# Patient Record
Sex: FEMALE | Race: WHITE | NOT HISPANIC OR LATINO | Employment: OTHER | URBAN - METROPOLITAN AREA
[De-identification: names, ages, dates, MRNs, and addresses within clinical notes are randomized per-mention and may not be internally consistent; named-entity substitution may affect disease eponyms.]

---

## 2017-05-01 LAB — INR PPP: 2.2 (ref 2–3.5)

## 2017-06-05 ENCOUNTER — TELEPHONE (OUTPATIENT)
Dept: VASCULAR LAB | Facility: MEDICAL CENTER | Age: 72
End: 2017-06-05

## 2017-06-05 ENCOUNTER — ANTICOAGULATION VISIT (OUTPATIENT)
Dept: MEDICAL GROUP | Facility: MEDICAL CENTER | Age: 72
End: 2017-06-05
Payer: COMMERCIAL

## 2017-06-05 VITALS — HEART RATE: 98 BPM | SYSTOLIC BLOOD PRESSURE: 118 MMHG | DIASTOLIC BLOOD PRESSURE: 67 MMHG

## 2017-06-05 DIAGNOSIS — Z79.01 LONG TERM (CURRENT) USE OF ANTICOAGULANTS: Chronic | ICD-10-CM

## 2017-06-05 DIAGNOSIS — I48.91 ATRIAL FIBRILLATION, UNSPECIFIED TYPE (HCC): ICD-10-CM

## 2017-06-05 DIAGNOSIS — Z79.01 LONG TERM CURRENT USE OF ANTICOAGULANT THERAPY: ICD-10-CM

## 2017-06-05 LAB — INR PPP: 1.9 (ref 2–3.5)

## 2017-06-05 PROCEDURE — 85610 PROTHROMBIN TIME: CPT | Performed by: PHYSICIAN ASSISTANT

## 2017-06-05 RX ORDER — WARFARIN SODIUM 5 MG/1
5 TABLET ORAL DAILY
Qty: 30 TAB | Refills: 3 | Status: SHIPPED | OUTPATIENT
Start: 2017-06-05

## 2017-06-05 NOTE — PROGRESS NOTES
Anticoagulation Summary as of 6/5/2017     INR goal 2.0-3.0   Selected INR 2.2 (5/1/2017)   Maintenance plan 5 mg (5 mg x 1) on Mon, Wed, Fri; 2.5 mg (5 mg x 0.5) all other days   Weekly total 25 mg   Plan last modified Zachary Ansari, PHARMD (6/5/2017)   Next INR check 7/3/2017   Target end date     Indications   Atrial fibrillation (CMS-HCC) [I48.91]  Long term current use of anticoagulant therapy [Z79.01]         Anticoagulation Episode Summary     [facility]    Preferred lab     Send INR reminders to     Comments       Anticoagulation Care Providers     Provider Role Specialty Phone number    [facility] Responsible  [phone]        Anticoagulation Patient Findings      Past Medical History   Diagnosis Date   • Atrial fibrillation (CMS-HCC) 10/26/2011   • Long term (current) use of anticoagulants 10/26/2011   • Right bundle branch block 10/26/2011   • DM (diabetes mellitus) (CMS-HCC) 10/26/2011   • Esophageal reflux 2/18/2011   • GOUT 10/26/2011   • HTN (hypertension) 10/26/2011   • Paroxysmal atrial fibrillation (CMS-HCC) 7/14/2009       Current Outpatient Prescriptions on File Prior to Visit   Medication Sig Dispense Refill   • potassium chloride SA (K-DUR) 20 MEQ Tab CR 1 tab daily 30 Tab 0   • furosemide (LASIX) 20 MG Tab Take 1 Tab by mouth See Admin Instructions. Take 20mg every other day. 90 Tab 2   • enalapril (VASOTEC) 10 MG Tab Take 1 Tab by mouth every day. 90 Tab 3   • metoprolol (LOPRESSOR) 100 MG Tab Take 1 Tab by mouth 2 times a day. 180 Tab 3   • TURMERIC CURCUMIN PO Take  by mouth.     • Fexofenadine HCl (ALLEGRA PO) Take  by mouth.     • Cholecalciferol (VITAMIN D3) 5000 UNIT TABS Take 5,000 Units by mouth every day.       • warfarin (COUMADIN) 2.5 MG TABS Take 1 Tab by mouth every day at 6 PM. TAKE AS DIRECTED. 30 Tab 3   • warfarin (COUMADIN) 5 MG TABS Take 1 Tab by mouth every day at 6 PM. TAKE AS DIRECTED. 30 Tab 3   • colchicine (COLCRYS)  0.6 MG TABS Take 0.6 mg by mouth every day.         No current facility-administered medications on file prior to visit.       Lab Results   Component Value Date/Time    SODIUM 141 01/25/2017 11:39 AM    POTASSIUM 4.6 01/25/2017 11:39 AM    CHLORIDE 103 01/25/2017 11:39 AM    CO2 22 01/25/2017 11:39 AM    GLUCOSE 97 01/25/2017 11:39 AM    BUN 32* 01/25/2017 11:39 AM    CREATININE 1.17* 01/25/2017 11:39 AM    BUN-CREATININE RATIO 27* 01/25/2017 11:39 AM        Shannon Mahajan referred to the [facility] for  A.fib, she has been on Warfarin for a few years,managed by PCP. Her last INR 4 weeks ago was therapeutic. She gets her INRs each month. She missed a dose this last week because of moving back to [place]. Appears to have a good understanding of warfarin, diet and bleed/stroke risks with this drug.     CHADSVAS=3, DM on problem list, but looks like it is a error per pt and labs.     INR  sub-therapeutic.      Pt gave verbal consent  to leave a VM with detailed medical information regarding INR and dose of warfarin.    Pt tolerating medication well, no s/s of bleeding.     Med rec done with pt (see above)    Pt education done (s/s of bleeding, when to f/u with clinic vs ER, foods with vitamin K, etc)    Follow up appointment in 4 week(s), I would like to see her sooner but she declined as she reports she is stable most of the time, she is aware of the risk of warfarin and that it requires frequent monitoring.     1.5 tabs today then continue weekly warfarin dose as noted      Zachary Ansari, PHARMD

## 2017-06-05 NOTE — MR AVS SNAPSHOT
Shannon WADDELL Keyshawn   2017 9:15 AM   Anticoagulation Visit   MRN: [id number]    [facility]   Dept Phone:  [phone]    Description:  Female : 1945   Provider:  [facility]           Allergies as of 2017     No Known Allergies      You were diagnosed with     Atrial fibrillation, unspecified type (CMS-HCC)   [0628090]       Long term current use of anticoagulant therapy   [5660324]         Vital Signs     Blood Pressure Pulse Smoking Status             118/67 mmHg 98 Former Smoker         Basic Information     Date Of Birth Sex Race Ethnicity Preferred Language    1945 Female White Non- English      Your appointments     2017  9:15 AM   Anti-Coag New Patient with [facility]   [facility] ([place]    [address]  [address]  [place] [address] [phone]            2017  9:00 AM   Anti-Coag Routine with [facility]   [facility] ([place]    [address]  [address]  [place] [address] [phone]              Problem List              ICD-10-CM Priority Class Noted - Resolved    Atrial fibrillation (CMS-HCC) (Chronic) I48.91   10/26/2011 - Present    Long term current use of anticoagulant therapy (Chronic) Z79.01   10/26/2011 - Present    Right bundle branch block (Chronic) I45.10   10/26/2011 - Present    DM (diabetes mellitus) (CMS-HCC) (Chronic) E11.9   10/26/2011 - Present    Esophageal reflux (Chronic) K21.9   2011 - Present    Gout (Chronic) M10.9   10/26/2011 - Present    HTN (hypertension) (Chronic) I10   10/26/2011 - Present    Edema R60.9   10/9/2013 - Present      Health Maintenance        Date Due Completion Dates    A1C SCREENING 3/6/1946 ---    DIABETES MONOFILAMENT / LE EXAM 3/6/1946 ---    RETINAL SCREENING 1961 ---    URINE ACR / MICROALBUMIN 1961 ---    IMM DTaP/Tdap/Td Vaccine (1 - Tdap)  9/6/1962 ---    PAP SMEAR 9/6/1964 ---    COLONOSCOPY 9/6/1993 ---    IMM ZOSTER VACCINE 9/6/2003 ---    MAMMOGRAM 9/15/2005 9/15/2004    BONE DENSITY 9/6/2008 ---    IMM PNEUMOCOCCAL 65+ (ADULT) LOW/MEDIUM RISK SERIES (1 of 2 - PCV13) 9/6/2008 ---    FASTING LIPID PROFILE 10/19/2017 10/19/2016    SERUM CREATININE 1/25/2018 1/25/2017, 10/19/2016            Results     POCT Protime      Component    INR    1.9    Comment:     ic valid [id number] exp 03/2018                PROTHROMBIN TIME      Component    INR    2.2                        Current Immunizations     No immunizations on file.      Below and/or attached are the medications your provider expects you to take. Review all of your home medications and newly ordered medications with your provider and/or pharmacist. Follow medication instructions as directed by your provider and/or pharmacist. Please keep your medication list with you and share with your provider. Update the information when medications are discontinued, doses are changed, or new medications (including over-the-counter products) are added; and carry medication information at all times in the event of emergency situations     Allergies:  No Known Allergies          Medications  Valid as of: June 05, 2017 -  9:11 AM    Generic Name Brand Name Tablet Size Instructions for use    Cholecalciferol (Tab) Vitamin D3 5000 UNITS Take 5,000 Units by mouth every day.          Colchicine (Tab) COLCRYS 0.6 MG Take 0.6 mg by mouth every day.          Enalapril Maleate (Tab) VASOTEC 10 MG Take 1 Tab by mouth every day.        Fexofenadine HCl   Take  by mouth.        Furosemide (Tab) LASIX 20 MG Take 1 Tab by mouth See Admin Instructions. Take 20mg every other day.        Metoprolol Tartrate (Tab) LOPRESSOR 100 MG Take 1 Tab by mouth 2 times a day.        Potassium Chloride Sheila CR (Tab CR) Kdur 20 MEQ 1 tab daily        Turmeric   Take  by mouth.        Warfarin Sodium (Tab) COUMADIN 2.5 MG Take 1 Tab by  mouth every day at 6 PM. TAKE AS DIRECTED.        Warfarin Sodium (Tab) COUMADIN 5 MG Take 1 Tab by mouth every day at 6 PM. TAKE AS DIRECTED.        .                 Medicines prescribed today were sent to:     [id number] - [place], [place] - [address] AT [address]    [address] [facility] [address]    Phone: [phone] Fax: [phone]    Open 24 Hours?: No    [id number] - [place], [place] - [address]    [address] [place] [address]    Phone: [phone] Fax: [phone]    Open 24 Hours?: No      Medication refill instructions:       If your prescription bottle indicates you have medication refills left, it is not necessary to call your provider’s office. Please contact your pharmacy and they will refill your medication.    If your prescription bottle indicates you do not have any refills left, you may request refills at any time through one of the following ways: The online [employer]urant system (except Urgent Care), by calling your provider’s office, or by asking your pharmacy to contact your provider’s office with a refill request. Medication refills are processed only during regular business hours and may not be available until the next business day. Your provider may request additional information or to have a follow-up visit with you prior to refilling your medication.   *Please Note: Medication refills are assigned a new Rx number when refilled electronically. Your pharmacy may indicate that no refills were authorized even though a new prescription for the same medication is available at the pharmacy. Please request the medicine by name with the pharmacy before contacting your provider for a refill.        Warfarin Dosing Calendar   June 2017 Details    Sun Mon Tue Wed Thu Fri Sat         1               2               3                 4               5   1.9   7.5 mg   See details      6      2.5 mg         7      5 mg          8      2.5 mg         9      5 mg         10      2.5 mg           11      2.5 mg         12      5 mg         13      2.5 mg         14      5 mg         15      2.5 mg         16      5 mg         17      2.5 mg           18      2.5 mg         19      5 mg         20      2.5 mg         21      5 mg         22      2.5 mg         23      5 mg         24      2.5 mg           25      2.5 mg         26      5 mg         27      2.5 mg         28      5 mg         29      2.5 mg         30      5 mg           Date Details   06/05 This INR check   INR: 1.9    valid [id number] 160 exp 03/2018               How to take your warfarin dose     To take:  2.5 mg Take 0.5 of a 5 mg tablet.    To take:  5 mg Take 1 of the 5 mg tablets.    To take:  7.5 mg Take 1.5 of the 5 mg tablets.           Warfarin Dosing Calendar   July 2017 Details    Sun Mon Tue Wed Thu Fri Sat           1      2.5 mg           2      2.5 mg         3      5 mg         4               5               6               7               8                 9               10               11               12               13               14               15                 16               17               18               19               20               21               22                 23               24               25               26               27               28               29                 30               31                     Date Details   No additional details    Date of next INR:  7/3/2017         How to take your warfarin dose     To take:  2.5 mg Take 0.5 of a 5 mg tablet.    To take:  5 mg Take 1 of the 5 mg tablets.              m2M [employer]ies Access Code: [email or web address]  Expires: 7/5/2017  9:11 AM    m2M [employer]ies  A secure, online tool to manage your health information     BlueLithium® is a secure, online tool that connects you to your personalized health information from the privacy of your home -- day or night - making  it very easy for you to manage your healthcare. Once the activation process is completed, you can even access your medical information using the Starvine felicitas, which is available for free in the Apple Felicitas store or Google Play store.     Starvine provides the following levels of access (as shown below):   My Chart Features   [facility] Doctor [facility]  Specialists [facility]  Urgent  Care [facility]  Primary Care  Doctor   Email your healthcare team securely and privately 24/7 X X X    Manage appointments: schedule your next appointment; view details of past/upcoming appointments X      Request prescription refills. X      View recent personal medical records, including lab and immunizations X X X X   View health record, including health history, allergies, medications X X X X   Read reports about your outpatient visits, procedures, consult and ER notes X X X X   See your discharge summary, which is a recap of your hospital and/or ER visit that includes your diagnosis, lab results, and care plan. X X       How to register for Starvine:  1. Go to  [email or web address].  2. Click on the Sign Up Now box, which takes you to the New Member Sign Up page. You will need to provide the following information:  a. Enter your Starvine Access Code exactly as it appears at the top of this page. (You will not need to use this code after you’ve completed the sign-up process. If you do not sign up before the expiration date, you must request a new code.)   b. Enter your date of birth.   c. Enter your home email address.   d. Click Submit, and follow the next screen’s instructions.  3. Create a Starvine ID. This will be your Starvine login ID and cannot be changed, so think of one that is secure and easy to remember.  4. Create a Starvine password. You can change your password at any time.  5. Enter your Password Reset Question and Answer. This can be used at a later time if you forget your password.   6. Enter your e-mail address.  This allows you to receive e-mail notifications when new information is available in EdRover.  7. Click Sign Up. You can now view your health information.    For assistance activating your EdRover account, call [phone]

## 2017-06-06 NOTE — TELEPHONE ENCOUNTER
Initial anticoagulation clinic note and most recent cardiology note reviewed  Patient with atrial fibrillation andCHADS-VASC score = approximately 3    Will continue with indefinite anticoagulation with warfarin as recommended by cardiology.  Agree with plans for anticoagulation as outlined by anticoagulation clinical staff.  Will defer management of rhythm and rate control as well as all other cardiovascular issues to cardiology.  Will defer management of all other medical issues to cardiologist and PCP    Michael J Bloch, MD  Anticoagulation Clinic    cc:  Checo Parsons

## 2017-07-03 ENCOUNTER — ANTICOAGULATION VISIT (OUTPATIENT)
Dept: MEDICAL GROUP | Facility: MEDICAL CENTER | Age: 72
End: 2017-07-03
Payer: COMMERCIAL

## 2017-07-03 VITALS — SYSTOLIC BLOOD PRESSURE: 121 MMHG | HEART RATE: 91 BPM | DIASTOLIC BLOOD PRESSURE: 70 MMHG

## 2017-07-03 DIAGNOSIS — I48.91 ATRIAL FIBRILLATION, UNSPECIFIED TYPE (HCC): ICD-10-CM

## 2017-07-03 DIAGNOSIS — Z79.01 LONG TERM CURRENT USE OF ANTICOAGULANT THERAPY: ICD-10-CM

## 2017-07-03 LAB — INR PPP: 3.4 (ref 2–3.5)

## 2017-07-03 PROCEDURE — 85610 PROTHROMBIN TIME: CPT | Performed by: INTERNAL MEDICINE

## 2017-07-03 NOTE — PROGRESS NOTES
Anticoagulation Summary as of 7/3/2017     INR goal 2.0-3.0   Selected INR 3.4! (7/3/2017)   Maintenance plan 5 mg (5 mg x 1) on Mon, Wed, Fri; 2.5 mg (2.5 mg x 1) all other days   Weekly total 25 mg   Plan last modified Zachary Ansari, PHARMD (6/5/2017)   Next INR check 7/31/2017   Target end date     Indications   Atrial fibrillation (CMS-HCC) [I48.91]  Long term current use of anticoagulant therapy [Z79.01]         Anticoagulation Episode Summary     [facility]    Preferred lab     Send INR reminders to     Comments       Anticoagulation Care Providers     Provider Role Specialty Phone number    [facility] Responsible  [phone]        Anticoagulation Patient Findings      Current Outpatient Prescriptions on File Prior to Visit   Medication Sig Dispense Refill   • warfarin (COUMADIN) 5 MG Tab Take 1 Tab by mouth every day at 6 PM. TAKE AS DIRECTED. 30 Tab 3   • potassium chloride SA (K-DUR) 20 MEQ Tab CR 1 tab daily 30 Tab 0   • furosemide (LASIX) 20 MG Tab Take 1 Tab by mouth See Admin Instructions. Take 20mg every other day. 90 Tab 2   • enalapril (VASOTEC) 10 MG Tab Take 1 Tab by mouth every day. 90 Tab 3   • metoprolol (LOPRESSOR) 100 MG Tab Take 1 Tab by mouth 2 times a day. 180 Tab 3   • TURMERIC CURCUMIN PO Take  by mouth.     • Fexofenadine HCl (ALLEGRA PO) Take  by mouth.     • Cholecalciferol (VITAMIN D3) 5000 UNIT TABS Take 5,000 Units by mouth every day.       • warfarin (COUMADIN) 2.5 MG TABS Take 1 Tab by mouth every day at 6 PM. TAKE AS DIRECTED. 30 Tab 3   • colchicine (COLCRYS) 0.6 MG TABS Take 0.6 mg by mouth every day.         No current facility-administered medications on file prior to visit.       Lab Results   Component Value Date/Time    SODIUM 141 01/25/2017 11:39 AM    POTASSIUM 4.6 01/25/2017 11:39 AM    CHLORIDE 103 01/25/2017 11:39 AM    CO2 22 01/25/2017 11:39 AM    GLUCOSE 97 01/25/2017 11:39 AM    BUN 32* 01/25/2017 11:39 AM     CREATININE 1.17* 01/25/2017 11:39 AM    BUN-CREATININE RATIO 27* 01/25/2017 11:39 AM          Shannon Mahajan seen in clinic today  INR  supra-therapeutic.    Denies signs/symptoms of bleeding and/or thrombosis.    Denies changes to diet or medications.   Follow up appointment in 4 week(s).    Hold today then continue weekly warfarin dose as noted     Zachary Ansari, PHARMD

## 2017-07-03 NOTE — MR AVS SNAPSHOT
Shannon WADDELL Mahajan   7/3/2017 9:00 AM   Anticoagulation Visit   MRN: [id number]    Department:  [facility] [place] [facility]   Dept Phone:  [phone]    Description:  Female : 1945   Provider:  [facility]           Allergies as of 7/3/2017     No Known Allergies      You were diagnosed with     Atrial fibrillation, unspecified type (CMS-HCC)   [1385147]       Long term current use of anticoagulant therapy   [6694526]         Vital Signs     Blood Pressure Pulse Smoking Status             121/70 mmHg 91 Former Smoker         Basic Information     Date Of Birth Sex Race Ethnicity Preferred Language    1945 Female White Non- English      Your appointments     2017  9:15 AM   Anti-Coag Routine with [facility] PHARMACIST   [facility] [facility]    [address]  [address]  [place] [id number] [phone]              Problem List              ICD-10-CM Priority Class Noted - Resolved    Atrial fibrillation (CMS-HCC) (Chronic) I48.91   10/26/2011 - Present    Long term current use of anticoagulant therapy (Chronic) Z79.01   10/26/2011 - Present    Right bundle branch block (Chronic) I45.10   10/26/2011 - Present    DM (diabetes mellitus) (CMS-HCC) (Chronic) E11.9   10/26/2011 - Present    Esophageal reflux (Chronic) K21.9   2011 - Present    Gout (Chronic) M10.9   10/26/2011 - Present    HTN (hypertension) (Chronic) I10   10/26/2011 - Present    Edema R60.9   10/9/2013 - Present      Health Maintenance        Date Due Completion Dates    A1C SCREENING 3/6/1946 ---    DIABETES MONOFILAMENT / LE EXAM 3/6/1944 ---    RETINAL SCREENING 1961 ---    URINE ACR / MICROALBUMIN 1961 ---    IMM DTaP/Tdap/Td Vaccine (1 - Tdap) 1962 ---    PAP SMEAR 1964 ---    COLONOSCOPY 1993 ---    IMM ZOSTER VACCINE 2003 ---    MAMMOGRAM 9/15/2005 9/15/2004    BONE DENSITY 2008 ---    IMM PNEUMOCOCCAL 65+ (ADULT) LOW/MEDIUM RISK  SERIES (1 of 2 - PCV13) 9/6/2008 ---    IMM INFLUENZA (1) 9/1/2017 ---    FASTING LIPID PROFILE 10/19/2017 10/19/2016    SERUM CREATININE 1/25/2018 1/25/2017, 10/19/2016            Results     POCT Protime      Component    INR    3.4    Comment:     ic valid [id number] exp 03/2018                        Current Immunizations     No immunizations on file.      Below and/or attached are the medications your provider expects you to take. Review all of your home medications and newly ordered medications with your provider and/or pharmacist. Follow medication instructions as directed by your provider and/or pharmacist. Please keep your medication list with you and share with your provider. Update the information when medications are discontinued, doses are changed, or new medications (including over-the-counter products) are added; and carry medication information at all times in the event of emergency situations     Allergies:  No Known Allergies          Medications  Valid as of: July 03, 2017 -  9:10 AM    Generic Name Brand Name Tablet Size Instructions for use    Cholecalciferol (Tab) Vitamin D3 5000 UNITS Take 5,000 Units by mouth every day.          Colchicine (Tab) COLCRYS 0.6 MG Take 0.6 mg by mouth every day.          Enalapril Maleate (Tab) VASOTEC 10 MG Take 1 Tab by mouth every day.        Fexofenadine HCl   Take  by mouth.        Furosemide (Tab) LASIX 20 MG Take 1 Tab by mouth See Admin Instructions. Take 20mg every other day.        Metoprolol Tartrate (Tab) LOPRESSOR 100 MG Take 1 Tab by mouth 2 times a day.        Potassium Chloride Sheila CR (Tab CR) Kdur 20 MEQ 1 tab daily        Turmeric   Take  by mouth.        Warfarin Sodium (Tab) COUMADIN 2.5 MG Take 1 Tab by mouth every day at 6 PM. TAKE AS DIRECTED.        Warfarin Sodium (Tab) COUMADIN 5 MG Take 1 Tab by mouth every day at 6 PM. TAKE AS DIRECTED.        .                 Medicines prescribed today were sent to:     [facility]/PHARMACY #[id number] -  [place], [place] - [address] AT [facility]    [address] [facility]. [facility] [address]    Phone: [phone] Fax: [phone]    Open 24 Hours?: No    [id number] - [place], [place] - [address]    [address] [place] [address]    Phone: [phone] Fax: [phone]    Open 24 Hours?: No      Medication refill instructions:       If your prescription bottle indicates you have medication refills left, it is not necessary to call your provider’s office. Please contact your pharmacy and they will refill your medication.    If your prescription bottle indicates you do not have any refills left, you may request refills at any time through one of the following ways: The online [employer] system (except Urgent Care), by calling your provider’s office, or by asking your pharmacy to contact your provider’s office with a refill request. Medication refills are processed only during regular business hours and may not be available until the next business day. Your provider may request additional information or to have a follow-up visit with you prior to refilling your medication.   *Please Note: Medication refills are assigned a new Rx number when refilled electronically. Your pharmacy may indicate that no refills were authorized even though a new prescription for the same medication is available at the pharmacy. Please request the medicine by name with the pharmacy before contacting your provider for a refill.        Your To Do List     Standing Orders Interval Expires    PROTHROMBIN TIME  daily until 7/3/2018 7/3/2018      Warfarin Dosing Calendar   July 2017 Details    Sun Mon Tue Wed Thu Fri Sat           1                 2               3   3.4   Hold   See details      4      2.5 mg         5      5 mg         6      2.5 mg         7      5 mg         8      2.5 mg           9      2.5 mg         10      5 mg         11      2.5 mg         12      5 mg         13         2.5 mg         14      5 mg         15      2.5 mg           16      2.5 mg         17      5 mg         18      2.5 mg         19      5 mg         20      2.5 mg         21      5 mg         22      2.5 mg           23      2.5 mg         24      5 mg         25      2.5 mg         26      5 mg         27      2.5 mg         28      5 mg         29      2.5 mg           30      2.5 mg         31      5 mg               Date Details   07/03 This INR check   INR: 3.4    valid [id number] 160 exp 03/2018       Date of next INR:  7/31/2017         How to take your warfarin dose     To take:  2.5 mg Take 1 of the 2.5 mg tablets.    To take:  5 mg Take 1 of the 5 mg tablets.    Hold Do not take your warfarin dose. See the Details table to the right for additional instructions.                   GTxcel Access Code: [email or web address]  Expires: 7/5/2017  9:11 AM    GTxcel  A secure, online tool to manage your health information     [employer] GTxcel® is a secure, online tool that connects you to your personalized health information from the privacy of your home -- day or night - making it very easy for you to manage your healthcare. Once the activation process is completed, you can even access your medical information using the GTxcel felicitas, which is available for free in the Apple Felicitas store or Google Play store.     GTxcel provides the following levels of access (as shown below):   My Chart Features   [facility] Doctor [facility]  Urgent  Care Non-[facility]  Primary Care  Doctor   Email your healthcare team securely and privately 24/7 X X X    Manage appointments: schedule your next appointment; view details of past/upcoming appointments X      Request prescription refills. X      View recent personal medical records, including lab and immunizations X X X X   View health record, including health history, allergies, medications X X X X   Read reports about your outpatient visits,  procedures, consult and ER notes X X X X   See your discharge summary, which is a recap of your hospital and/or ER visit that includes your diagnosis, lab results, and care plan. X X       How to register for [facility]:  1. Go to  [email or web address].  2. Click on the Sign Up Now box, which takes you to the New Member Sign Up page. You will need to provide the following information:  a. Enter your [facility] Access Code exactly as it appears at the top of this page. (You will not need to use this code after you’ve completed the sign-up process. If you do not sign up before the expiration date, you must request a new code.)   b. Enter your date of birth.   c. Enter your home email address.   d. Click Submit, and follow the next screen’s instructions.  3. Create a BrandMe crowdmarketingt ID. This will be your BrandMe crowdmarketingt login ID and cannot be changed, so think of one that is secure and easy to remember.  4. Create a BrandMe crowdmarketingt password. You can change your password at any time.  5. Enter your Password Reset Question and Answer. This can be used at a later time if you forget your password.   6. Enter your e-mail address. This allows you to receive e-mail notifications when new information is available in [facility].  7. Click Sign Up. You can now view your health information.    For assistance activating your [facility] account, call [phone]

## 2017-07-27 ENCOUNTER — ANTICOAGULATION VISIT (OUTPATIENT)
Dept: MEDICAL GROUP | Facility: MEDICAL CENTER | Age: 72
End: 2017-07-27
Payer: COMMERCIAL

## 2017-07-27 DIAGNOSIS — Z79.01 LONG TERM CURRENT USE OF ANTICOAGULANT THERAPY: ICD-10-CM

## 2017-07-27 DIAGNOSIS — I48.91 ATRIAL FIBRILLATION, UNSPECIFIED TYPE (HCC): ICD-10-CM

## 2017-07-27 LAB — INR PPP: 2.8 (ref 2–3.5)

## 2017-07-27 PROCEDURE — 85610 PROTHROMBIN TIME: CPT | Performed by: PHYSICIAN ASSISTANT

## 2017-07-27 NOTE — PROGRESS NOTES
OP Anticoagulation Service Note    Date: 7/27/2017    Anticoagulation Summary as of 7/27/2017     INR goal 2.0-3.0   Selected INR 2.8 (7/27/2017)   Maintenance plan 5 mg (5 mg x 1) on Mon, Wed, Fri; 2.5 mg (2.5 mg x 1) all other days   Weekly total 25 mg   Plan last modified LISETTE HarrisD (6/5/2017)   Next INR check 8/24/2017   Target end date     Indications   Atrial fibrillation (CMS-HCC) [I48.91]  Long term current use of anticoagulant therapy [Z79.01]         Anticoagulation Episode Summary     INR check location [facility]    Preferred lab     Send INR reminders to     Comments       Anticoagulation Care Providers     Provider Role Specialty Phone number    [facility] Responsible  [phone]        Anticoagulation Patient Findings      Plan:  Patient is therapeutic today. Confirmed dosing regimen. Patient denies any changes in medications or diet. Patient denies any signs or symptoms of bleeding or clotting. Instructed patient to call clinic if any unusual bleeding or bruising occurs. Will continue dosing as outlined. Will follow-up with patient in 4 week(s). She will be gone in [place]. She was given a standing order and she will check in 4 weeks.       Bere Mills, Pharm D

## 2017-07-27 NOTE — MR AVS SNAPSHOT
Shannon WADDELL Keyshawn   2017 2:45 PM   Anticoagulation Visit   MRN: [id number]    Department:  [facility]   Dept Phone:  [phone]    Description:  Female : 1945   Provider:  [facility]           Allergies as of 2017     No Known Allergies      You were diagnosed with     Atrial fibrillation, unspecified type (CMS-[facility])   [4985915]       Long term current use of anticoagulant therapy   [9632365]         Vital Signs     Smoking Status                   Former Smoker           Basic Information     Date Of Birth Sex Race Ethnicity Preferred Language    1945 Female White Non- English      Problem List              ICD-10-CM Priority Class Noted - Resolved    Atrial fibrillation (CMS-HCC) (Chronic) I48.91   10/26/2011 - Present    Long term current use of anticoagulant therapy (Chronic) Z79.01   10/26/2011 - Present    Right bundle branch block (Chronic) I45.10   10/26/2011 - Present    DM (diabetes mellitus) (CMS-HCC) (Chronic) E11.9   10/26/2011 - Present    Esophageal reflux (Chronic) K21.9   2011 - Present    Gout (Chronic) M10.9   10/26/2011 - Present    HTN (hypertension) (Chronic) I10   10/26/2011 - Present    Edema R60.9   10/9/2013 - Present      Health Maintenance        Date Due Completion Dates    A1C SCREENING 3/6/1946 ---    DIABETES MONOFILAMENT / LE EXAM 3/6/1944 ---    RETINAL SCREENING 1961 ---    URINE ACR / MICROALBUMIN 1961 ---    IMM DTaP/Tdap/Td Vaccine (1 - Tdap) 1962 ---    PAP SMEAR 1964 ---    COLONOSCOPY 1993 ---    IMM ZOSTER VACCINE 2003 ---    MAMMOGRAM 9/15/2005 9/15/2004    BONE DENSITY 2008 ---    IMM PNEUMOCOCCAL 65+ (ADULT) LOW/MEDIUM RISK SERIES (1 of 2 - PCV13) 2008 ---    IMM INFLUENZA (1) 2017 ---    FASTING LIPID PROFILE 10/19/2017 10/19/2016    SERUM CREATININE 2018, 10/19/2016            Results     POCT Protime      Component    INR    2.8                        Current  Immunizations     No immunizations on file.      Below and/or attached are the medications your provider expects you to take. Review all of your home medications and newly ordered medications with your provider and/or pharmacist. Follow medication instructions as directed by your provider and/or pharmacist. Please keep your medication list with you and share with your provider. Update the information when medications are discontinued, doses are changed, or new medications (including over-the-counter products) are added; and carry medication information at all times in the event of emergency situations     Allergies:  No Known Allergies          Medications  Valid as of: July 27, 2017 -  2:58 PM    Generic Name Brand Name Tablet Size Instructions for use    Cholecalciferol (Tab) Vitamin D3 5000 UNITS Take 5,000 Units by mouth every day.          Colchicine (Tab) COLCRYS 0.6 MG Take 0.6 mg by mouth every day.          Enalapril Maleate (Tab) VASOTEC 10 MG Take 1 Tab by mouth every day.        Fexofenadine HCl   Take  by mouth.        Furosemide (Tab) LASIX 20 MG Take 1 Tab by mouth See Admin Instructions. Take 20mg every other day.        Metoprolol Tartrate (Tab) LOPRESSOR 100 MG Take 1 Tab by mouth 2 times a day.        Potassium Chloride Sheila CR (Tab CR) Kdur 20 MEQ 1 tab daily        Turmeric   Take  by mouth.        Warfarin Sodium (Tab) COUMADIN 2.5 MG Take 1 Tab by mouth every day at 6 PM. TAKE AS DIRECTED.        Warfarin Sodium (Tab) COUMADIN 5 MG Take 1 Tab by mouth every day at 6 PM. TAKE AS DIRECTED.        .                 Medicines prescribed today were sent to:     [facility]/PHARMACY #[id number] - [place], [place] - [address] [facility]. AT [facility]. [address]    [address] [facility] [address]    Phone: [phone] Fax: [phone]    Open 24 Hours?: No    [id number] [place] - [address]    [address] [facility] [place] [address]    Phone: [phone] Fax:  [phone]    Open 24 Hours?: No      Medication refill instructions:       If your prescription bottle indicates you have medication refills left, it is not necessary to call your provider’s office. Please contact your pharmacy and they will refill your medication.    If your prescription bottle indicates you do not have any refills left, you may request refills at any time through one of the following ways: The online [employer] system (except Urgent Care), by calling your provider’s office, or by asking your pharmacy to contact your provider’s office with a refill request. Medication refills are processed only during regular business hours and may not be available until the next business day. Your provider may request additional information or to have a follow-up visit with you prior to refilling your medication.   *Please Note: Medication refills are assigned a new Rx number when refilled electronically. Your pharmacy may indicate that no refills were authorized even though a new prescription for the same medication is available at the pharmacy. Please request the medicine by name with the pharmacy before contacting your provider for a refill.        Your To Do List     Standing Orders Interval Expires    PROTHROMBIN TIME  daily until 7/27/2018 7/27/2018      Warfarin Dosing Calendar   July 2017 Details    Sun Mon Tue Wed Thu Fri Sat           1                 2               3               4               5               6               7               8                 9               10               11               12               13               14               15                 16               17               18               19               20               21               22                 23               24               25               26               27   2.8   2.5 mg   See details      28      5 mg         29      2.5 mg           30      2.5 mg         31      5 mg               Date  Details   07/27 This INR check   INR: 2.8               How to take your warfarin dose     To take:  2.5 mg Take 1 of the 2.5 mg tablets.    To take:  5 mg Take 1 of the 5 mg tablets.           Warfarin Dosing Calendar   August 2017 Details    Sun Mon Tue Wed Thu Fri Sat       1      2.5 mg         2      5 mg         3      2.5 mg         4      5 mg         5      2.5 mg           6      2.5 mg         7      5 mg         8      2.5 mg         9      5 mg         10      2.5 mg         11      5 mg         12      2.5 mg           13      2.5 mg         14      5 mg         15      2.5 mg         16      5 mg         17      2.5 mg         18      5 mg         19      2.5 mg           20      2.5 mg         21      5 mg         22      2.5 mg         23      5 mg         24      2.5 mg         25               26                 27               28               29               30               31                  Date Details   No additional details    Date of next INR:  8/24/2017         How to take your warfarin dose     To take:  2.5 mg Take 1 of the 2.5 mg tablets.    To take:  5 mg Take 1 of the 5 mg tablets.              Aunt Bertha Access Code: [email or web address]  Expires: 8/3/2017  4:14 AM    Aunt Bertha  A secure, online tool to manage your health information     [employer] Aunt Bertha® is a secure, online tool that connects you to your personalized health information from the privacy of your home -- day or night - making it very easy for you to manage your healthcare. Once the activation process is completed, you can even access your medical information using the Aunt Bertha felicitas, which is available for free in the Apple Felicitas store or Google Play store.     Aunt Bertha provides the following levels of access (as shown below):   My Chart Features   [facility] Doctor [facility]  Specialists [facility]  Urgent  Care Non-[facility]  Primary Care  Doctor   Email your healthcare team securely and privately 24/7 X X X    Manage  appointments: schedule your next appointment; view details of past/upcoming appointments X      Request prescription refills. X      View recent personal medical records, including lab and immunizations X X X X   View health record, including health history, allergies, medications X X X X   Read reports about your outpatient visits, procedures, consult and ER notes X X X X   See your discharge summary, which is a recap of your hospital and/or ER visit that includes your diagnosis, lab results, and care plan. X X       How to register for yourdelivery:  1. Go to  [email or web address].  2. Click on the Sign Up Now box, which takes you to the New Member Sign Up page. You will need to provide the following information:  a. Enter your yourdelivery Access Code exactly as it appears at the top of this page. (You will not need to use this code after you’ve completed the sign-up process. If you do not sign up before the expiration date, you must request a new code.)   b. Enter your date of birth.   c. Enter your home email address.   d. Click Submit, and follow the next screen’s instructions.  3. Create a yourdelivery ID. This will be your yourdelivery login ID and cannot be changed, so think of one that is secure and easy to remember.  4. Create a yourdelivery password. You can change your password at any time.  5. Enter your Password Reset Question and Answer. This can be used at a later time if you forget your password.   6. Enter your e-mail address. This allows you to receive e-mail notifications when new information is available in yourdelivery.  7. Click Sign Up. You can now view your health information.    For assistance activating your yourdelivery account, call [phone]

## 2017-08-31 ENCOUNTER — ANTICOAGULATION MONITORING (OUTPATIENT)
Dept: VASCULAR LAB | Facility: MEDICAL CENTER | Age: 72
End: 2017-08-31

## 2017-08-31 DIAGNOSIS — Z79.01 LONG TERM CURRENT USE OF ANTICOAGULANT THERAPY: ICD-10-CM

## 2017-08-31 DIAGNOSIS — I48.91 ATRIAL FIBRILLATION, UNSPECIFIED TYPE (HCC): ICD-10-CM

## 2017-08-31 LAB — INR PPP: 2.18 (ref 2–3.5)

## 2017-08-31 NOTE — PROGRESS NOTES
OP Anticoagulation Telephone Note    Date: 8/31/2017  Anticoagulation Summary  As of 8/31/2017    INR goal:   2.0-3.0   TTR:   69.3 % (2.6 mo)   Today's INR:   2.18   Maintenance plan:   5 mg (5 mg x 1) on Mon, Wed, Fri; 2.5 mg (2.5 mg x 1) all other days   Weekly total:   25 mg   No change documented:   Reza Doll Ass't   Plan last modified:   Zachary Ansari PharmD (6/5/2017)   Next INR check:   9/28/2017   Target end date:       Indications    Atrial fibrillation (CMS-HCC) [I48.91]  Long term current use of anticoagulant therapy [Z79.01]             Anticoagulation Episode Summary     [facility] check [facility]    Preferred lab:       Send INR reminders to:       Comments:         Anticoagulation Care Providers     Provider Role Specialty Phone number    [facility] Responsible  [phone]        Anticoagulation Patient Findings  Patient Findings     Negatives:   Signs/symptoms of thrombosis, Signs/symptoms of bleeding, Laboratory test error suspected, Change in health, Change in alcohol use, Change in activity, Upcoming invasive procedure, Emergency department visit, Upcoming dental procedure, Missed doses, Extra doses, Change in medications, Change in diet/appetite, Hospital admission, Bruising, Other complaints      Plan: Left message on patient's answering machine/ voicemail. Instructed patient to call back with any concerns regarding any unusual bleeding or bruising, any medication or diet changes, or any signs or symptoms of thrombosis. Instructed patient to resume medication as outlined above. Patient to follow up in 4 weeks.     Lynne Ornelas, Medical Assistant    I have reviewed and agree with the plan above on  9/5/2017    Bere Mills, Pharm D

## 2017-09-28 ENCOUNTER — ANTICOAGULATION VISIT (OUTPATIENT)
Dept: MEDICAL GROUP | Facility: MEDICAL CENTER | Age: 72
End: 2017-09-28
Payer: COMMERCIAL

## 2017-09-28 DIAGNOSIS — Z79.01 LONG TERM CURRENT USE OF ANTICOAGULANT THERAPY: ICD-10-CM

## 2017-09-28 DIAGNOSIS — I48.91 ATRIAL FIBRILLATION, UNSPECIFIED TYPE (HCC): ICD-10-CM

## 2017-09-28 LAB — INR PPP: 3.3 (ref 2–3.5)

## 2017-09-28 PROCEDURE — 85610 PROTHROMBIN TIME: CPT | Performed by: FAMILY MEDICINE

## 2017-09-28 PROCEDURE — 99211 OFF/OP EST MAY X REQ PHY/QHP: CPT | Performed by: FAMILY MEDICINE

## 2017-09-28 NOTE — PROGRESS NOTES
OP Anticoagulation Service Note    Date: 9/28/2017  There were no vitals filed for this visit.    Anticoagulation Summary  As of 9/28/2017    INR goal:   2.0-3.0   TTR:   70.4 % (3.5 mo)   Today's INR:   3.3!   Maintenance plan:   5 mg (5 mg x 1) on Mon, Wed, Fri; 2.5 mg (2.5 mg x 1) all other days   Weekly total:   25 mg   Plan last modified:   Zachary Ansari, PharmD (6/5/2017)   Next INR check:   10/12/2017   Target end date:       Indications    Atrial fibrillation (CMS-HCC) [I48.91]  Long term current use of anticoagulant therapy [Z79.01]             Anticoagulation Episode Summary     INR check location:   [facility]    Preferred lab:       Send INR reminders to:       Comments:         Anticoagulation Care Providers     Provider Role Specialty Phone number    [facility] Responsible  [phone]        Anticoagulation Patient Findings  Patient Findings     Positives:   Change in diet/appetite    Negatives:   Signs/symptoms of thrombosis, Signs/symptoms of bleeding, Laboratory test error suspected, Change in health, Change in alcohol use, Change in activity, Upcoming invasive procedure, Emergency department visit, Upcoming dental procedure, Missed doses, Extra doses, Change in medications, Hospital admission, Bruising, Other complaints          HPI:   Shannon Mahajan seen in clinic today, on anticoagulation therapy with warfarin for atrial fibrillation  Confirmed warfarin dosing regimen  Changes to current medical/health status since last appt:   Denies signs/symptoms of bleeding and/or thrombosis since the last appt.    Pt in the middle of moving and diet has been different.   Denies any interval changes to medications since last appt.   Denies any complications or cost restrictions with current therapy.       A/P   INR  supra-therapeutic.   Tonight no warfarin then resume usual regimen    Follow up appointment in 2 week(s).  Bere Mills, Pharm D

## 2017-10-11 ENCOUNTER — TELEPHONE (OUTPATIENT)
Dept: CARDIOLOGY | Facility: MEDICAL CENTER | Age: 72
End: 2017-10-11

## 2017-10-11 NOTE — TELEPHONE ENCOUNTER
----- Message from Jerrica Zaman sent at 10/11/2017  2:01 PM PDT -----  Regarding: patient calling about January lab results  ABBY/Fay    Patient is calling to discuss labs she had done in January. She said she never got a call with the results. She can be reached at [phone].

## 2017-10-12 ENCOUNTER — ANTICOAGULATION VISIT (OUTPATIENT)
Dept: MEDICAL GROUP | Facility: MEDICAL CENTER | Age: 72
End: 2017-10-12
Payer: COMMERCIAL

## 2017-10-12 DIAGNOSIS — Z79.01 LONG TERM CURRENT USE OF ANTICOAGULANT THERAPY: ICD-10-CM

## 2017-10-12 DIAGNOSIS — I48.91 ATRIAL FIBRILLATION, UNSPECIFIED TYPE (HCC): ICD-10-CM

## 2017-10-12 DIAGNOSIS — I48.91 ATRIAL FIBRILLATION, UNSPECIFIED TYPE (HCC): Chronic | ICD-10-CM

## 2017-10-12 LAB — INR PPP: 2.5 (ref 2–3.5)

## 2017-10-12 PROCEDURE — 85610 PROTHROMBIN TIME: CPT | Performed by: FAMILY MEDICINE

## 2017-10-12 PROCEDURE — 99211 OFF/OP EST MAY X REQ PHY/QHP: CPT | Performed by: FAMILY MEDICINE

## 2017-10-13 ENCOUNTER — TELEPHONE (OUTPATIENT)
Dept: CARDIOLOGY | Facility: MEDICAL CENTER | Age: 72
End: 2017-10-13

## 2017-10-14 NOTE — TELEPHONE ENCOUNTER
----- Message from Melanie Marte sent at 10/13/2017  3:40 PM PDT -----  Regarding: Pt calling to discuss results   Contact: [phone]  ABBY/Melina    Pt is calling to discuss/clarify lab results she received. She can be reached at [phone].

## 2017-10-14 NOTE — TELEPHONE ENCOUNTER
Pt said she did not get letter regarding results of BMP last January because she was in [place].   Advised her Dr. Almaraz said results we acceptable.    [place] is moving out of state and does not have a cardiologist yet.  On warfarin.      Advised her to get new cardiologist [facility] and suggested she should see Dr. Almaraz before she goes.  She agreed.  Scheduled for 10/17 at 3:20

## 2017-10-17 ENCOUNTER — OFFICE VISIT (OUTPATIENT)
Dept: CARDIOLOGY | Facility: MEDICAL CENTER | Age: 72
End: 2017-10-17
Payer: COMMERCIAL

## 2017-10-17 VITALS
HEART RATE: 122 BPM | OXYGEN SATURATION: 96 % | WEIGHT: 190 LBS | BODY MASS INDEX: 31.62 KG/M2 | DIASTOLIC BLOOD PRESSURE: 80 MMHG | SYSTOLIC BLOOD PRESSURE: 110 MMHG

## 2017-10-17 DIAGNOSIS — I48.91 ATRIAL FIBRILLATION, UNSPECIFIED TYPE (HCC): ICD-10-CM

## 2017-10-17 DIAGNOSIS — I10 ESSENTIAL HYPERTENSION: ICD-10-CM

## 2017-10-17 DIAGNOSIS — I45.10 RIGHT BUNDLE BRANCH BLOCK: Chronic | ICD-10-CM

## 2017-10-17 DIAGNOSIS — Z79.01 LONG TERM CURRENT USE OF ANTICOAGULANT THERAPY: Chronic | ICD-10-CM

## 2017-10-17 LAB — EKG IMPRESSION: NORMAL

## 2017-10-17 PROCEDURE — 99214 OFFICE O/P EST MOD 30 MIN: CPT | Performed by: INTERNAL MEDICINE

## 2017-10-17 PROCEDURE — 93000 ELECTROCARDIOGRAM COMPLETE: CPT | Performed by: INTERNAL MEDICINE

## 2017-10-17 RX ORDER — ENALAPRIL MALEATE 10 MG/1
10 TABLET ORAL
Qty: 90 TAB | Refills: 3 | Status: SHIPPED | OUTPATIENT
Start: 2017-10-17

## 2017-10-17 RX ORDER — METOPROLOL TARTRATE 100 MG/1
100 TABLET ORAL 2 TIMES DAILY
Qty: 180 TAB | Refills: 3 | Status: SHIPPED | OUTPATIENT
Start: 2017-10-17

## 2017-10-17 NOTE — LETTER
[facility]   [address], [address]  [place] [address]  Phone: [phone]  Fax: [phone]              Shannon Mahajan  1945    Encounter Date: 10/17/2017    Eleazar Almaraz M.D.          PROGRESS NOTE:  Subjective:   Shannon Mahajan is a 72 y.o. female who presents today with persistent atrial fib on rate control. Missed a dose of meds this am,. Moving to [place]. No chest pain or SB. More stress with the move.    Past Medical History:   Diagnosis Date   • Atrial fibrillation (CMS-HCC) 10/26/2011   • Long term (current) use of anticoagulants 10/26/2011   • Right bundle branch block 10/26/2011   • DM (diabetes mellitus) (CMS-HCC) 10/26/2011   • GOUT 10/26/2011   • HTN (hypertension) 10/26/2011   • Esophageal reflux 2/18/2011   • Paroxysmal atrial fibrillation (CMS-HCC) 7/14/2009     No past surgical history on file.  History reviewed. No pertinent family history.  History   Smoking Status   • Former Smoker   • Years: 20.00   Smokeless Tobacco   • Never Used     No Known Allergies  Outpatient Encounter Prescriptions as of 10/17/2017   Medication Sig Dispense Refill   • Methylsulfonylmethane (MSM PO) Take  by mouth.     • metoprolol (LOPRESSOR) 100 MG Tab Take 1 Tab by mouth 2 times a day. 180 Tab 3   • enalapril (VASOTEC) 10 MG Tab Take 1 Tab by mouth every day. 90 Tab 3   • warfarin (COUMADIN) 5 MG Tab Take 1 Tab by mouth every day at 6 PM. TAKE AS DIRECTED. 30 Tab 3   • potassium chloride SA (K-DUR) 20 MEQ Tab CR 1 tab daily 30 Tab 0   • furosemide (LASIX) 20 MG Tab Take 1 Tab by mouth See Admin Instructions. Take 20mg every other day. (Patient taking differently: Take 20 mg by mouth See Admin Instructions. Take 20mg every other day.) 90 Tab 2   • TURMERIC CURCUMIN PO Take  by mouth.     • Fexofenadine HCl (ALLEGRA PO) Take  by mouth.     • Cholecalciferol (VITAMIN D3) 5000 UNIT TABS Take 5,000 Units by mouth every day.       • warfarin (COUMADIN) 2.5 MG TABS Take  1 Tab by mouth every day at 6 PM. TAKE AS DIRECTED. 30 Tab 3   • colchicine (COLCRYS) 0.6 MG TABS Take 0.6 mg by mouth every day.       • [DISCONTINUED] enalapril (VASOTEC) 10 MG Tab Take 1 Tab by mouth every day. 90 Tab 3   • [DISCONTINUED] metoprolol (LOPRESSOR) 100 MG Tab Take 1 Tab by mouth 2 times a day. 180 Tab 3     No facility-administered encounter medications on file as of 10/17/2017.      ROS     Objective:   /80   Pulse (!) 122   Wt 86.2 kg (190 lb)   SpO2 96%   BMI 31.62 kg/m²      Physical Exam   Constitutional: She is oriented to person, place, and time. She appears well-developed. No distress.   HENT:   Mouth/Throat: Mucous membranes are normal.   Eyes: Conjunctivae and EOM are normal.   Neck: No JVD present. No tracheal deviation present. No thyroid mass and no thyromegaly present.   Cardiovascular: Normal rate and intact distal pulses.  An irregularly irregular rhythm present.   No murmur heard.  Pulmonary/Chest: Effort normal and breath sounds normal. No respiratory distress. She exhibits no tenderness.   Abdominal: Soft. There is no tenderness.   Musculoskeletal: Normal range of motion. She exhibits no edema.   Neurological: She is alert and oriented to person, place, and time. She has normal strength. She displays no tremor.   Skin: Skin is warm and dry. She is not diaphoretic.   Psychiatric: She has a normal mood and affect. Her behavior is normal.   Vitals reviewed.      Assessment:     1. Atrial fibrillation, unspecified type (CMS-HCC)  EKG   2. Essential hypertension  metoprolol (LOPRESSOR) 100 MG Tab    enalapril (VASOTEC) 10 MG Tab   3. Long term current use of anticoagulant therapy     4. Right bundle branch block         Medical Decision Making:  Today's Assessment / Status / Plan:   1. A fib persistent continue rate control.  2. Htn well controlled.  3. Anticoagulation continue.  4. F/U in new location.      Checo Parsons M.D.  [address] [place] [address]  [place] [address]  VIA Facsimile:  [phone]

## 2017-10-17 NOTE — PROGRESS NOTES
Subjective:   Shannon Mahajan is a 72 y.o. female who presents today with persistent atrial fib on rate control. Missed a dose of meds this am,. Moving to [place]. No chest pain or SB. More stress with the move.    Past Medical History:   Diagnosis Date   • Atrial fibrillation (CMS-HCC) 10/26/2011   • Long term (current) use of anticoagulants 10/26/2011   • Right bundle branch block 10/26/2011   • DM (diabetes mellitus) (CMS-HCC) 10/26/2011   • GOUT 10/26/2011   • HTN (hypertension) 10/26/2011   • Esophageal reflux 2/18/2011   • Paroxysmal atrial fibrillation (CMS-HCC) 7/14/2009     No past surgical history on file.  History reviewed. No pertinent family history.  History   Smoking Status   • Former Smoker   • Years: 20.00   Smokeless Tobacco   • Never Used     No Known Allergies  Outpatient Encounter Prescriptions as of 10/17/2017   Medication Sig Dispense Refill   • Methylsulfonylmethane (MSM PO) Take  by mouth.     • metoprolol (LOPRESSOR) 100 MG Tab Take 1 Tab by mouth 2 times a day. 180 Tab 3   • enalapril (VASOTEC) 10 MG Tab Take 1 Tab by mouth every day. 90 Tab 3   • warfarin (COUMADIN) 5 MG Tab Take 1 Tab by mouth every day at 6 PM. TAKE AS DIRECTED. 30 Tab 3   • potassium chloride SA (K-DUR) 20 MEQ Tab CR 1 tab daily 30 Tab 0   • furosemide (LASIX) 20 MG Tab Take 1 Tab by mouth See Admin Instructions. Take 20mg every other day. (Patient taking differently: Take 20 mg by mouth See Admin Instructions. Take 20mg every other day.) 90 Tab 2   • TURMERIC CURCUMIN PO Take  by mouth.     • Fexofenadine HCl (ALLEGRA PO) Take  by mouth.     • Cholecalciferol (VITAMIN D3) 5000 UNIT TABS Take 5,000 Units by mouth every day.       • warfarin (COUMADIN) 2.5 MG TABS Take 1 Tab by mouth every day at 6 PM. TAKE AS DIRECTED. 30 Tab 3   • colchicine (COLCRYS) 0.6 MG TABS Take 0.6 mg by mouth every day.       • [DISCONTINUED] enalapril (VASOTEC) 10 MG Tab Take 1 Tab by mouth every day. 90 Tab 3   • [DISCONTINUED] metoprolol  (LOPRESSOR) 100 MG Tab Take 1 Tab by mouth 2 times a day. 180 Tab 3     No facility-administered encounter medications on file as of 10/17/2017.      ROS     Objective:   /80   Pulse (!) 122   Wt 86.2 kg (190 lb)   SpO2 96%   BMI 31.62 kg/m²     Physical Exam   Constitutional: She is oriented to person, place, and time. She appears well-developed. No distress.   HENT:   Mouth/Throat: Mucous membranes are normal.   Eyes: Conjunctivae and EOM are normal.   Neck: No JVD present. No tracheal deviation present. No thyroid mass and no thyromegaly present.   Cardiovascular: Normal rate and intact distal pulses.  An irregularly irregular rhythm present.   No murmur heard.  Pulmonary/Chest: Effort normal and breath sounds normal. No respiratory distress. She exhibits no tenderness.   Abdominal: Soft. There is no tenderness.   Musculoskeletal: Normal range of motion. She exhibits no edema.   Neurological: She is alert and oriented to person, place, and time. She has normal strength. She displays no tremor.   Skin: Skin is warm and dry. She is not diaphoretic.   Psychiatric: She has a normal mood and affect. Her behavior is normal.   Vitals reviewed.      Assessment:     1. Atrial fibrillation, unspecified type (CMS-HCC)  EKG   2. Essential hypertension  metoprolol (LOPRESSOR) 100 MG Tab    enalapril (VASOTEC) 10 MG Tab   3. Long term current use of anticoagulant therapy     4. Right bundle branch block         Medical Decision Making:  Today's Assessment / Status / Plan:   1. A fib persistent continue rate control.  2. Htn well controlled.  3. Anticoagulation continue.  4. F/U in new location.

## 2017-11-13 ENCOUNTER — ANTICOAGULATION MONITORING (OUTPATIENT)
Dept: VASCULAR LAB | Facility: MEDICAL CENTER | Age: 72
End: 2017-11-13

## 2017-11-13 DIAGNOSIS — I48.91 ATRIAL FIBRILLATION, UNSPECIFIED TYPE (HCC): ICD-10-CM

## 2017-11-13 DIAGNOSIS — Z79.01 LONG TERM CURRENT USE OF ANTICOAGULANT THERAPY: ICD-10-CM

## 2017-11-13 LAB — INR PPP: 2.5 (ref 2–3.5)

## 2017-11-13 NOTE — PROGRESS NOTES
Anticoagulation Summary  As of 11/13/2017    INR goal:   2.0-3.0   TTR:   75.8 % (5 mo)   Today's INR:   2.5   Maintenance plan:   5 mg (5 mg x 1) on Mon, Wed, Fri; 2.5 mg (2.5 mg x 1) all other days   Weekly total:   25 mg   Plan last modified:   Zachary Ansari, PharmD (6/5/2017)   Next INR check:   12/11/2017   Target end date:       Indications    Atrial fibrillation (CMS-HCC) [I48.91]  Long term current use of anticoagulant therapy [Z79.01]             Anticoagulation Episode Summary     INR check location:   [facility]    Preferred lab:       Send INR reminders to:       Comments:         Anticoagulation Care Providers     Provider Role Specialty Phone number    [facility] Responsible  [phone]        Anticoagulation Patient Findings        LM on VM . Patient is therapeutic.Requested she call back for any medication or diet changes, or  current symptoms of bleeding or thrombosis reported. Continue current regimen. Follow up in 4 weeks.      Continue current medication regimen.          REGINA Lopez.

## 2018-01-11 ENCOUNTER — ANTICOAGULATION MONITORING (OUTPATIENT)
Dept: VASCULAR LAB | Facility: MEDICAL CENTER | Age: 73
End: 2018-01-11

## 2018-01-11 DIAGNOSIS — Z79.01 LONG TERM CURRENT USE OF ANTICOAGULANT THERAPY: ICD-10-CM

## 2018-01-11 NOTE — PROGRESS NOTES
Called pt for INR reminder.   Per pt, she has moved to [place] and her anticoagulation is being manager by Dr. Richard there.     Will close to clinic here.     Rachelle Davies, PharmD

## 2019-04-29 ENCOUNTER — ANTICOAGULATION VISIT (OUTPATIENT)
Dept: MEDICAL GROUP | Facility: MEDICAL CENTER | Age: 74
End: 2019-04-29
Payer: COMMERCIAL

## 2019-04-29 DIAGNOSIS — I48.91 ATRIAL FIBRILLATION, UNSPECIFIED TYPE (HCC): ICD-10-CM

## 2019-04-29 LAB — INR PPP: 1.5 (ref 2–3.5)

## 2019-04-29 NOTE — PROGRESS NOTES
Patient here today for POCT INR only, orders scanned under media. INR 1.5, pt will report to her cardiologist.     Bere Mills, Pharm D

## 2019-06-05 ENCOUNTER — ANTICOAGULATION MONITORING (OUTPATIENT)
Dept: CARDIOLOGY | Facility: PHYSICIAN GROUP | Age: 74
End: 2019-06-05

## 2019-06-05 DIAGNOSIS — I48.91 ATRIAL FIBRILLATION, UNSPECIFIED TYPE (HCC): ICD-10-CM

## 2019-06-05 NOTE — PROGRESS NOTES
Discharged from [facility].  Anticoagulation [place] her cardiologist [place] of [place].  Delia Puckett, Clinical Pharmacist, CDE, CACP

## 2025-07-01 NOTE — PROGRESS NOTES
OP Anticoagulation Service Note    Date: 10/12/2017  There were no vitals filed for this visit.    Anticoagulation Summary  As of 10/12/2017    INR goal:   2.0-3.0   TTR:   69.4 % (4 mo)   Today's INR:   2.5   Maintenance plan:   5 mg (5 mg x 1) on Mon, Wed, Fri; 2.5 mg (2.5 mg x 1) all other days   Weekly total:   25 mg   Plan last modified:   Zachary Ansari PharmD (6/5/2017)   Next INR check:   11/9/2017   Target end date:       Indications    Atrial fibrillation (CMS-HCC) [I48.91]  Long term current use of anticoagulant therapy [Z79.01]             Anticoagulation Episode Summary     [facility]R check location:   [facility]    Preferred lab:       Send INR reminders to:       Comments:         Anticoagulation Care Providers     Provider Role Specialty Phone number    [facility] Responsible  [phone]        Anticoagulation Patient Findings      HPI:   Shannon WADDELL Keyshawn seen in clinic today, on anticoagulation therapy with warfarin for atrial fibrillation  Confirmed warfarin dosing regimen  Changes to current medical/health status since last appt:   Denies signs/symptoms of bleeding and/or thrombosis since the last appt.    Denies any interval changes to diet  Denies any interval changes to medications since last appt.   Denies any complications or cost restrictions with current therapy.       A/P   INR  -therapeutic.   Continue current regimen.     Follow up appointment in 4 week(s) via lab in [place] or [place]. If she goes back to [place] she will be managed by her PCP there.  Bere Mills, Anya       01-Jul-2025 20:11